# Patient Record
Sex: FEMALE | Race: OTHER | URBAN - METROPOLITAN AREA
[De-identification: names, ages, dates, MRNs, and addresses within clinical notes are randomized per-mention and may not be internally consistent; named-entity substitution may affect disease eponyms.]

---

## 2024-06-01 ENCOUNTER — HOSPITAL ENCOUNTER (EMERGENCY)
Facility: HOSPITAL | Age: 24
Discharge: HOME | End: 2024-06-01
Admitting: STUDENT IN AN ORGANIZED HEALTH CARE EDUCATION/TRAINING PROGRAM

## 2024-06-01 VITALS
DIASTOLIC BLOOD PRESSURE: 70 MMHG | HEART RATE: 79 BPM | RESPIRATION RATE: 19 BRPM | TEMPERATURE: 98.8 F | OXYGEN SATURATION: 96 % | SYSTOLIC BLOOD PRESSURE: 114 MMHG

## 2024-06-01 PROBLEM — Z3A.36 36 WEEKS GESTATION OF PREGNANCY: Status: ACTIVE | Noted: 2024-06-01

## 2024-06-01 PROCEDURE — NC001 PR NO CHARGE: Performed by: STUDENT IN AN ORGANIZED HEALTH CARE EDUCATION/TRAINING PROGRAM

## 2024-06-01 PROCEDURE — 99212 OFFICE O/P EST SF 10 MIN: CPT

## 2024-06-01 NOTE — LETTER
June 1, 2024     Dilia Lee  [address]  [facility] [address]    Patient: Dilia Lee   YOB: 2000   Date of Visit: 6/1/2024       Dear Dr. Lee:    Thank you for referring Dilia Lee to me for evaluation. She is stable and cleared for incarceration at this time.     Sincerely,    Rocky Clifford MD        CC: No Recipients

## 2024-06-01 NOTE — PROGRESS NOTES
"Triage Note - OB  Dilia Lee 24 y.o. female MRN: [id number]  Unit/Bed#: -01 Encounter: [id number]    OB TRIAGE NOTE  Dilia Lee  [id number]  2024  5:40 AM  /-    ASSESS:  24 y.o.  at 36w4d with abdominal cramping, not in labor    PLAN  #1. R/o labor  SVE: 0/0/-3, soft  FHT reactive with occasional contractions   Advised increasing water intake, using hot showers/baths and Tylenol for pain uterine cramping    #2. Headache  Discussed that it can also be due to dehydration  S/p Tylenol at correction, improved since then    #2. Discharge instructions  Patient instructed to call if experiencing worsening contractions, vaginal bleeding, loss of fluid or decreased fetal movement.  Will follow up with OBGYN on 24 in [facility].    D/w Dr. Oseguera  ______________    SUBJECTIVE    SOHA: 24    HPI Chronology:  24 y.o.  36w4d presents with complaint of pelvic cramping and headache earlier today. She was recently taken as an inmate earlier today. She was given Tylenol at the correction with improvement in headache. She reports she \"barely\" drinks water every day and is active all day.  She reports she has no complications in this pregnancy and has been receiving care throughout the pregnancy in [place]    Contractions: no  Leakage: no  Bleeding: no  Fetal Movement: yes  Pelvic pain: yes    Vitals:   /70 (BP Location: Left arm)   Pulse 79   Temp 98.8 °F (37.1 °C) (Oral)   Resp 19   SpO2 96%   There is no height or weight on file to calculate BMI.    Review of Systems   Constitutional:  Negative for chills and fever.   Eyes:  Negative for visual disturbance.   Respiratory:  Negative for chest tightness and shortness of breath.    Cardiovascular:  Negative for chest pain and palpitations.   Gastrointestinal:  Positive for abdominal pain.   Genitourinary:  Negative for vaginal bleeding, vaginal discharge and vaginal pain.   Musculoskeletal:  Negative for back " pain.   Neurological:  Negative for facial asymmetry and headaches.       Physical Exam  Constitutional:       Appearance: She is obese.   HENT:      Mouth/Throat:      Pharynx: Oropharynx is clear.   Eyes:      Conjunctiva/sclera: Conjunctivae normal.   Cardiovascular:      Rate and Rhythm: Normal rate.      Pulses: Normal pulses.   Pulmonary:      Effort: Pulmonary effort is normal.   Abdominal:      Palpations: Abdomen is soft.      Tenderness: There is no abdominal tenderness.   Skin:     General: Skin is warm.   Neurological:      Mental Status: She is alert and oriented to person, place, and time.   Psychiatric:         Mood and Affect: Mood normal.         FHT:  Baseline Rate (FHR): 135 bpm  Variability: Moderate  Accelerations: 15 x 15 or greater  Decelerations: None  TOCO:   Contraction Frequency (minutes): 4-9  Contraction Duration (seconds): 40-80  Contraction Intensity: Mild    Labs: No results found for this or any previous visit (from the past 24 hour(s)).    Lab, Imaging and other studies: I have personally reviewed pertinent reports.        Rocky Clifford MD  6/1/2024  5:40 AM